# Patient Record
Sex: MALE | Race: WHITE | NOT HISPANIC OR LATINO | Employment: UNEMPLOYED | URBAN - METROPOLITAN AREA
[De-identification: names, ages, dates, MRNs, and addresses within clinical notes are randomized per-mention and may not be internally consistent; named-entity substitution may affect disease eponyms.]

---

## 2024-05-30 ENCOUNTER — HOSPITAL ENCOUNTER (OUTPATIENT)
Facility: HOSPITAL | Age: 54
Discharge: HOME | End: 2024-05-30
Attending: EMERGENCY MEDICINE | Admitting: EMERGENCY MEDICINE
Payer: MEDICAID

## 2024-05-30 VITALS
HEART RATE: 83 BPM | WEIGHT: 134 LBS | DIASTOLIC BLOOD PRESSURE: 91 MMHG | RESPIRATION RATE: 18 BRPM | SYSTOLIC BLOOD PRESSURE: 145 MMHG | OXYGEN SATURATION: 96 % | TEMPERATURE: 98.9 F | BODY MASS INDEX: 25.3 KG/M2 | HEIGHT: 61 IN

## 2024-05-30 DIAGNOSIS — K04.7 DENTAL INFECTION: Primary | ICD-10-CM

## 2024-05-30 DIAGNOSIS — K02.9 DENTAL CARIES: ICD-10-CM

## 2024-05-30 PROCEDURE — 99203 OFFICE O/P NEW LOW 30 MIN: CPT | Performed by: PHYSICIAN ASSISTANT

## 2024-05-30 PROCEDURE — G0463 HOSPITAL OUTPT CLINIC VISIT: HCPCS | Performed by: PHYSICIAN ASSISTANT

## 2024-05-30 RX ORDER — AMOXICILLIN AND CLAVULANATE POTASSIUM 875; 125 MG/1; MG/1
1 TABLET, FILM COATED ORAL 2 TIMES DAILY
Qty: 20 TABLET | Refills: 0 | Status: SHIPPED | OUTPATIENT
Start: 2024-05-30 | End: 2024-06-09

## 2024-05-30 RX ORDER — AMOXICILLIN AND CLAVULANATE POTASSIUM 875; 125 MG/1; MG/1
1 TABLET, FILM COATED ORAL ONCE
Status: COMPLETED | OUTPATIENT
Start: 2024-05-30 | End: 2024-05-30

## 2024-05-30 RX ORDER — LIDOCAINE HYDROCHLORIDE 20 MG/ML
SOLUTION OROPHARYNGEAL
Qty: 40 ML | Refills: 0 | Status: SHIPPED | OUTPATIENT
Start: 2024-05-30

## 2024-05-30 RX ADMIN — AMOXICILLIN AND CLAVULANATE POTASSIUM 1 TABLET: 875; 125 TABLET, FILM COATED ORAL at 16:37

## 2024-05-30 NOTE — DISCHARGE INSTRUCTIONS
Return to the ER with any further concerns, should your condition change/worsen, should you experience significant facial swelling, or should you develop a fever.

## 2024-05-30 NOTE — [facility] PROVIDER NOTE
EMERGENCY DEPARTMENT ENCOUNTER    Room Number:  12/12  Date seen:  5/30/2024  Time seen: 16:19 EDT  PCP: Provider, No Known  Historian: Patient    Discussed/obtained information from independent historians: [email or web address]    HPI:  Chief complaint: Dental caries and dental pain  A complete HPI/ROS/PMH/PSH/SH/FH are unobtainable due to: Nothing    Context:Kingston Morris is a 53 y.o. male with significant past medical history of absence abuse who presents to the ED with c/o dental pain and dental caries.  Patient reports he has an upcoming appointment with a dentist but is at least a month out.  He states he has had longstanding dental caries and mild pain associated with them that is intermittent.  He reports over the past few days the pain is significantly worsened.  No facial swelling or purulent discharge associated with the dental pain.  Patient does report that this feels very similar to previous early dental infections.  Being unable to get into see the dentist, he presents to the ED hoping to get started on some antibiotics to keep this from getting worse.  He states he is trying to get get an earlier appointment next week to see the dentist and is on a cancellation list at this time.  Patient does inform me he is recovering from substance abuse and would like something nonnarcotic to help with the pain until the antibiotics take effect.    External (non-ED) record review: All previous records are ED related [facility] male,    Chronic or social conditions impacting care:    ALLERGIES  Levaquin [levofloxacin]    PAST MEDICAL HISTORY  Active Ambulatory Problems     Diagnosis Date Noted    No Active Ambulatory Problems     Resolved Ambulatory Problems     Diagnosis Date Noted    No Resolved Ambulatory Problems     No Additional Past Medical History       PAST SURGICAL HISTORY  No past surgical history on file.    FAMILY HISTORY  No family history on file.    SOCIAL HISTORY  Social History     Socioeconomic History     Marital status:        REVIEW OF SYSTEMS  Review of Systems    All systems reviewed and negative except for those discussed in HPI.     PHYSICAL EXAM    I have reviewed the triage vital signs and nursing notes.  Vitals:    05/30/24 1607   BP: 145/91   Pulse: 83   Resp: 18   Temp: 98.9 °F (37.2 °C)   SpO2: 96%     Physical Exam    GENERAL: DW male, not distressed  HENT: nares patent.  Extensive caries tooth 10 through 16.  Gum erythema present.  No abscess or purulent drainage.  No significant facial swelling.  EYES: no scleral icterus  NECK: no ROM limitations  CV: regular rhythm, regular rate  RESPIRATORY: normal effort  ABDOMEN: soft  : deferred  MUSCULOSKELETAL: no deformity  NEURO: alert, moves all extremities, follows commands  SKIN: warm, dry    LAB RESULTS  No results found for this or any previous visit (from the past 24 hour(s)).    Ordered the above labs and independently interpreted results.  My findings will be discussed in the ED course or medical decision making section below    RADIOLOGY RESULTS  No Radiology Exams Resulted Within Past 24 Hours     Ordered the above noted radiological studies.  Independently interpreted by me.  My findings will be discussed in the medical decision section below.     PROGRESS, DATA ANALYSIS, CONSULTS AND MEDICAL DECISION MAKING    Please note that this section constitutes my independent interpretation of clinical data including lab results, radiology, EKG's.  This constitutes my independent professional opinion regarding differential diagnosis and management of this patient.  It may include any factors such as history from outside sources, review of external records, social determinants of health, management of medications, response to those treatments, and discussions with other providers.    ED Course as of 05/30/24 1631   Thu May 30, 2024   1609 BP: 145/91 [RC]   1610 Temp: 98.9 °F (37.2 °C) [RC]   1610 Resp: 18 [RC]   1610 SpO2: 96 %  RA [RC]      ED  Course User Index  [RC] Nilo Cadet III, PA     Orders placed during this visit:  No orders of the defined types were placed in this encounter.           Medical Decision Making  Risk  Prescription drug management.      This is most certainly extensive caries and early dental infection.  Will treat with Augmentin, diclofenac,  dental balls.  Will encourage the patient to follow-up with his dentist next available appointment.  Lab return to the ED with facial swelling or should he have any further concerns.      DIAGNOSIS  Final diagnoses:   Dental infection   Dental caries          Medication List        New Prescriptions      amoxicillin-clavulanate 875-125 MG per tablet  Commonly known as: AUGMENTIN  Take 1 tablet by mouth 2 (Two) Times a Day for 10 days.     diclofenac 50 MG EC tablet  Commonly known as: VOLTAREN  Take 1 tablet by mouth 3 (Three) Times a Day.     Lidocaine Viscous HCl 2 % solution  Commonly known as: XYLOCAINE  Soaked cottonball in the viscous lidocaine at home [place] over the painful tooth every 3 hours for pain               Where to Get Your Medications        These medications were sent to [facility] [place] - [address] - [phone]  - [phone]   [address] [place] [address]      Phone: [phone]   amoxicillin-clavulanate 875-125 MG per tablet  diclofenac 50 MG EC tablet  Lidocaine Viscous HCl 2 % solution         FOLLOW-UP  No follow-up provider specified.      Latest Documented Vital Signs:  As of 16:31 EDT  BP- 145/91 HR- 83 Temp- 98.9 °F (37.2 °C) O2 sat- 96%    Appropriate PPE utilized throughout this patient encounter to include mask, if indicated, per current protocol. Hand hygiene was performed before donning PPE and after removal when leaving the room.    Please note that portions of this were completed with a voice recognition program.     Note Disclaimer: At [facility], we believe that sharing information builds  trust and better relationships. You are receiving this note because you are receiving care at [facility] or recently visited. It is possible you will see health information before a provider has talked with you about it. This kind of information can be easy to misunderstand. To help you fully understand what it means for your health, we urge you to discuss this note with your provider.